# Patient Record
Sex: MALE | Race: WHITE | Employment: PART TIME | URBAN - METROPOLITAN AREA
[De-identification: names, ages, dates, MRNs, and addresses within clinical notes are randomized per-mention and may not be internally consistent; named-entity substitution may affect disease eponyms.]

---

## 2021-05-07 ENCOUNTER — HOSPITAL ENCOUNTER (EMERGENCY)
Age: 36
Discharge: HOME | End: 2021-05-07
Attending: EMERGENCY MEDICINE
Payer: COMMERCIAL

## 2021-05-07 VITALS
OXYGEN SATURATION: 98 % | HEIGHT: 77 IN | WEIGHT: 210 LBS | HEART RATE: 71 BPM | TEMPERATURE: 97.6 F | SYSTOLIC BLOOD PRESSURE: 139 MMHG | BODY MASS INDEX: 24.79 KG/M2 | DIASTOLIC BLOOD PRESSURE: 89 MMHG | RESPIRATION RATE: 16 BRPM
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
## 2021-05-07 DIAGNOSIS — L03.221 CELLULITIS OF NECK: Primary | ICD-10-CM

## 2021-05-07 PROCEDURE — 99282 EMERGENCY DEPT VISIT SF MDM: CPT

## 2021-05-07 RX ORDER — DOXYCYCLINE 100 MG/1
100 TABLET ORAL 2 TIMES DAILY
Qty: 20 TABLET | Refills: 0 | Status: SHIPPED | OUTPATIENT
Start: 2021-05-07 | End: 2021-05-17

## 2021-05-07 ASSESSMENT — ENCOUNTER SYMPTOMS
SHORTNESS OF BREATH: 0
VOMITING: 0
COUGH: 0
NAUSEA: 0

## 2021-05-07 ASSESSMENT — PAIN DESCRIPTION - DESCRIPTORS: DESCRIPTORS: SORE

## 2021-05-07 ASSESSMENT — PAIN SCALES - GENERAL: PAINLEVEL_OUTOF10: 3

## 2021-05-07 NOTE — ED PROVIDER NOTES
[address]  EMERGENCY DEPARTMENT ENCOUNTER      Pt Name: Viktor Askew  MRN: [id number]  [place] 1985  Date of evaluation: 5/7/21      CHIEF COMPLAINT       Chief Complaint   Patient presents with    Mass     on scalp     HISTORY OF PRESENT ILLNESS   HPI 31 y.o. male presents with c/o tick bite. Patient reports that 3 days ago he was bitten by a tick on the back of his neck. Over the last few days he has had some swelling and pain at the site. No fevers, no joint pains. He reports pain at the site is mild in severity rating at 3 out of 10. Constant in duration. No previous evaluations or treatments prior to arrival.. REVIEW OF SYSTEMS       Review of Systems   Constitutional: Negative for fever. HENT: Negative for congestion. Eyes: Negative for visual disturbance. Respiratory: Negative for cough and shortness of breath. Cardiovascular: Negative for chest pain. Gastrointestinal: Negative for nausea and vomiting. Musculoskeletal: Negative for arthralgias. Skin: Positive for rash. Neurological: Negative for dizziness and light-headedness. Psychiatric/Behavioral: Negative for confusion. PAST MEDICAL HISTORY     Past Medical History:   Diagnosis Date    Depression        SURGICAL HISTORY     History reviewed. No pertinent surgical history. CURRENT MEDICATIONS       Discharge Medication List as of 5/7/2021 12:53 PM      CONTINUE these medications which have NOT CHANGED    Details   PARoxetine (PAXIL) 30 MG tablet Take 30 mg by mouth every morning      cephALEXin (KEFLEX) 500 MG capsule Take 1 capsule by mouth 3 times daily, Disp-21 capsule, R-0      hydrocortisone (ANUSOL-HC) 25 MG suppository Place 1 suppository rectally 3 times daily, Disp-30 suppository, R-0             ALLERGIES     has No Known Allergies. FAMILY HISTORY     has no family status information on file. SOCIAL HISTORY      reports that he has been smoking cigarettes.  He uses smokeless tobacco.    PHYSICAL EXAM     INITIAL VITALS: /89   Pulse 71   Temp 97.6 °F (36.4 °C) (Temporal)   Resp 16   Ht 6' 5\" (1.956 m)   Wt 210 lb (95.3 kg)   SpO2 98%   BMI 24.90 kg/m²   Gen: NAD  Head: Normocephalic, atraumatic  Eye: Pupils equal round reactive to light, no conjunctivitis  ENT: MMM  Neck: There is a 1 cm x 1.5 cm area of erythema and induration on the left side of the lower portion of the neck. I do not appreciate any adenopathy. Heart: Regular rate and rhythm no murmurs  Lungs: Clear to auscultation bilaterally, no respiratory distress  Chest wall: No crepitus, no tenderness palpation  Abdomen: Soft, nontender, nondistended, with no peritoneal signs  Neurologic: Patient is alert and oriented x3, motor and sensation is intact in all 4 extremities, fluent speech  Extremities: No edema, no rash. MEDICAL DECISION MAKING:     MDM  31 y.o. male presenting with a tick bite and some cellulitis. My notes to target lesion. I think that likely has a cellulitis. I do not see signs of an abscess at this time. We will put on a course of doxycycline to treat for cellulitis. No sign of lyme disease. D/w pt the results, treatment plan, warning precautions for prompt ED return and importance of close OP FU, he verbalizes understanding and agrees with the treatment plan.        DIAGNOSTIC RESULTS     EMERGENCY DEPARTMENT COURSE:   Vitals:    Vitals:    05/07/21 1233   BP: 139/89   Pulse: 71   Resp: 16   Temp: 97.6 °F (36.4 °C)   TempSrc: Temporal   SpO2: 98%   Weight: 210 lb (95.3 kg)   Height: 6' 5\" (1.956 m)       The patient was given the following medications while in the emergency department:  Orders Placed This Encounter   Medications    doxycycline monohydrate (ADOXA) 100 MG tablet     Sig: Take 1 tablet by mouth 2 times daily for 10 days     Dispense:  20 tablet     Refill:  0     -------------------------  CRITICAL CARE:   CONSULTS: None  PROCEDURES: Procedures     FINAL IMPRESSION 1. Cellulitis of neck    2.  Tick bite, initial encounter          DISPOSITION/PLAN   DISPOSITION Decision To Discharge 05/07/2021 12:31:05 PM      PATIENT REFERRED TO:  Your Primary Care Provider    In 3 days      [facility] ED  [address]  [place] [address]  [phone]    If symptoms worsen      DISCHARGE MEDICATIONS:  Discharge Medication List as of 5/7/2021 12:53 PM      START taking these medications    Details   doxycycline monohydrate (ADOXA) 100 MG tablet Take 1 tablet by mouth 2 times daily for 10 days, Disp-20 tablet, R-0Normal               Venancio Green MD  Attending Emergency Physician                      Venancio Green MD  05/08/21 7702

## 2021-05-17 ENCOUNTER — HOSPITAL ENCOUNTER (EMERGENCY)
Age: 36
Discharge: HOME | End: 2021-05-17
Attending: EMERGENCY MEDICINE
Payer: COMMERCIAL

## 2021-05-17 ENCOUNTER — APPOINTMENT (OUTPATIENT)
Dept: GENERAL RADIOLOGY | Age: 36
End: 2021-05-17
Payer: COMMERCIAL

## 2021-05-17 VITALS
HEART RATE: 68 BPM | HEIGHT: 77 IN | DIASTOLIC BLOOD PRESSURE: 71 MMHG | OXYGEN SATURATION: 99 % | SYSTOLIC BLOOD PRESSURE: 148 MMHG | TEMPERATURE: 98 F | RESPIRATION RATE: 18 BRPM | WEIGHT: 217 LBS | BODY MASS INDEX: 25.62 KG/M2

## 2021-05-17 DIAGNOSIS — J02.9 ACUTE PHARYNGITIS, UNSPECIFIED ETIOLOGY: Primary | ICD-10-CM

## 2021-05-17 DIAGNOSIS — R09.82 POST-NASAL DRIP: ICD-10-CM

## 2021-05-17 LAB
DIRECT EXAM: NORMAL
Lab: NORMAL
SPECIMEN DESCRIPTION: NORMAL

## 2021-05-17 PROCEDURE — 99283 EMERGENCY DEPT VISIT LOW MDM: CPT

## 2021-05-17 PROCEDURE — 71045 X-RAY EXAM CHEST 1 VIEW: CPT

## 2021-05-17 PROCEDURE — 87880 STREP A ASSAY W/OPTIC: CPT

## 2021-05-17 RX ORDER — IBUPROFEN 600 MG/1
600 TABLET ORAL EVERY 6 HOURS PRN
Qty: 20 TABLET | Refills: 0 | Status: SHIPPED | OUTPATIENT
Start: 2021-05-17

## 2021-05-17 RX ORDER — FLUTICASONE PROPIONATE 50 MCG
1 SPRAY, SUSPENSION (ML) NASAL DAILY
Qty: 1 BOTTLE | Refills: 0 | Status: SHIPPED | OUTPATIENT
Start: 2021-05-17 | End: 2022-09-22

## 2021-05-17 NOTE — ED PROVIDER NOTES
eMERGENCY dEPARTMENT eNCOUnter   Independent Attestation     Pt Name: Treva Hernandez  MRN: [id number]  [place] 1985  Date of evaluation: 5/17/21     Treva Hernandez is a 31 y.o. male with CC: Pharyngitis      Based on the medical record the care appears appropriate. I was personally available for consultation in the Emergency Department.     Jose Guadalupe Benitez MD  Attending Emergency Physician                   Jose Guadalupe Benitez MD  05/17/21 8873
Disp-21 capsule, R-0      hydrocortisone (ANUSOL-HC) 25 MG suppository Place 1 suppository rectally 3 times daily, Disp-30 suppository, R-0             ALLERGIES     Patient has no known allergies. FAMILY HISTORY     No family history on file. No family status information on file. None otherwise stated in nurses notes    SOCIAL HISTORY      reports that he has been smoking cigarettes. He uses smokeless tobacco.   lives at home with others     PHYSICAL EXAM    (up to 7 for level 4, 8 or more for level 5)     ED Triage Vitals [05/17/21 1205]   BP Temp Temp Source Pulse Resp SpO2 Height Weight   (!) 148/71 98 °F (36.7 °C) Temporal 113 18 94 % 6' 5\" (1.956 m) 217 lb (98.4 kg)       Physical Exam   Nursing note and vitals reviewed. Constitutional: Oriented to person, place, and time and well-developed, well-nourished. Head: Normocephalic and atraumatic. Ear: External ears normal. TM non-erythematous with no canal erythema. Nose: Nose normal and midline. Eyes: Conjunctivae and EOM are normal. Pupils are equal, round, and reactive to light. Neck: Normal range of motion. Neck supple. Throat: Posterior pharynx is erythematous with no tonsillar swelling or exudates. Uvula midline. Airway patent. Cardiovascular: Normal rate, regular rhythm, normal heart sounds and intact distal pulses. Pulmonary/Chest: Effort normal and breath sounds normal. No respiratory distress. No wheezes. No rales. No chest tenderness. no rales, rhonchi, wheezing. Abdominal: Soft. Bowel sounds are normal. No distension and no mass. There is no tenderness. There is no rebound and no guarding. Musculoskeletal: Normal range of motion. Neurological: Alert and oriented to person, place, and time. GCS score is 15. Skin: Skin is warm and dry. No rash noted. No erythema. No pallor.    Psychiatric: Mood, memory, affect and judgment normal.           DIAGNOSTIC RESULTS     EKG: All EKG's are interpreted by the Emergency Department

## 2022-09-22 ENCOUNTER — OFFICE VISIT (OUTPATIENT)
Dept: PRIMARY CARE CLINIC | Age: 37
End: 2022-09-22
Payer: COMMERCIAL

## 2022-09-22 VITALS
DIASTOLIC BLOOD PRESSURE: 82 MMHG | HEART RATE: 56 BPM | SYSTOLIC BLOOD PRESSURE: 134 MMHG | BODY MASS INDEX: 24.21 KG/M2 | WEIGHT: 205 LBS | OXYGEN SATURATION: 97 % | HEIGHT: 77 IN | TEMPERATURE: 97.2 F

## 2022-09-22 DIAGNOSIS — L08.9 SKIN INFECTION: ICD-10-CM

## 2022-09-22 DIAGNOSIS — L08.9 STAPH SKIN INFECTION: Primary | ICD-10-CM

## 2022-09-22 DIAGNOSIS — R09.81 CONGESTION OF NASAL SINUS: ICD-10-CM

## 2022-09-22 DIAGNOSIS — R09.82 POST-NASAL DRIP: ICD-10-CM

## 2022-09-22 DIAGNOSIS — B95.8 STAPH SKIN INFECTION: Primary | ICD-10-CM

## 2022-09-22 PROCEDURE — 4004F PT TOBACCO SCREEN RCVD TLK: CPT | Performed by: NURSE PRACTITIONER

## 2022-09-22 PROCEDURE — 99213 OFFICE O/P EST LOW 20 MIN: CPT | Performed by: NURSE PRACTITIONER

## 2022-09-22 PROCEDURE — G8427 DOCREV CUR MEDS BY ELIG CLIN: HCPCS | Performed by: NURSE PRACTITIONER

## 2022-09-22 PROCEDURE — G8420 CALC BMI NORM PARAMETERS: HCPCS | Performed by: NURSE PRACTITIONER

## 2022-09-22 PROCEDURE — 99212 OFFICE O/P EST SF 10 MIN: CPT

## 2022-09-22 RX ORDER — LORATADINE 10 MG/1
10 TABLET ORAL DAILY
Qty: 30 TABLET | Refills: 0 | Status: SHIPPED | OUTPATIENT
Start: 2022-09-22 | End: 2022-10-22

## 2022-09-22 RX ORDER — FLUTICASONE PROPIONATE 50 MCG
1 SPRAY, SUSPENSION (ML) NASAL 2 TIMES DAILY
Qty: 16 G | Refills: 0 | Status: SHIPPED | OUTPATIENT
Start: 2022-09-22

## 2022-09-22 RX ORDER — MUPIROCIN CALCIUM 20 MG/G
CREAM TOPICAL
Qty: 1 EACH | Refills: 0 | Status: SHIPPED | OUTPATIENT
Start: 2022-09-22 | End: 2022-10-22

## 2022-09-22 ASSESSMENT — ENCOUNTER SYMPTOMS
VOMITING: 0
RESPIRATORY NEGATIVE: 1
GASTROINTESTINAL NEGATIVE: 1
COUGH: 0
DIARRHEA: 0
RHINORRHEA: 0

## 2022-09-22 NOTE — PROGRESS NOTES
[facility] A department of [facility]  [facility] [address]  Phone: [phone]  Fax: [phone]      Kilo Villagomez is a 30 y.o. male who presents to the [facility] today for his medical conditions/complaints as noted below. Kilo Villagomez is c/o of Otalgia (Pain/Blisters/bumps on outside of right ear )          HPI:     Blisters along the outside of the ear from wearing a blue tooth on the ear headset. Redness and swelling. Patient states that he popped the blisters earlier today. No fever. Otalgia   There is pain in the right ear. This is a new problem. The current episode started yesterday. The problem occurs constantly. The problem has been gradually worsening. There has been no fever. The pain is moderate. Associated symptoms include a rash (redness and blisters around outer edge of right ear. ). Pertinent negatives include no coughing, diarrhea, rhinorrhea or vomiting. He has tried nothing for the symptoms. The treatment provided no relief. There is no history of a chronic ear infection, hearing loss or a tympanostomy tube. Past Medical History:   Diagnosis Date    Depression         No Known Allergies    Wt Readings from Last 3 Encounters:   09/22/22 205 lb (93 kg)   05/17/21 217 lb (98.4 kg)   05/07/21 210 lb (95.3 kg)     BP Readings from Last 3 Encounters:   09/22/22 134/82   05/17/21 (!) 148/71   05/07/21 139/89      Temp Readings from Last 3 Encounters:   09/22/22 97.2 °F (36.2 °C)   05/17/21 98 °F (36.7 °C) (Temporal)   05/07/21 97.6 °F (36.4 °C) (Temporal)     Pulse Readings from Last 3 Encounters:   09/22/22 56   05/17/21 68   05/07/21 71     SpO2 Readings from Last 3 Encounters:   09/22/22 97%   05/17/21 99%   05/07/21 98%       Subjective:      Review of Systems   Constitutional: Negative. Negative for appetite change, chills and fever. HENT:  Positive for ear pain (right side along rim of ear.) and postnasal drip.  Negative for rhinorrhea. Respiratory: Negative. Negative for cough. Cardiovascular: Negative. Gastrointestinal: Negative. Negative for diarrhea and vomiting. Genitourinary: Negative. Musculoskeletal: Negative. Skin:  Positive for rash (redness and blisters around outer edge of right ear. ). Neurological: Negative. Hematological: Negative. Psychiatric/Behavioral: Negative. All other systems reviewed and are negative. Objective:     Vitals:    09/22/22 1857   BP: 134/82   Site: Left Upper Arm   Position: Sitting   Pulse: 56   Temp: 97.2 °F (36.2 °C)   SpO2: 97%   Weight: 205 lb (93 kg)   Height: 6' 5\" (1.956 m)     Body mass index is 24.31 kg/m². /82 (Site: Left Upper Arm, Position: Sitting)   Pulse 56   Temp 97.2 °F (36.2 °C)   Ht 6' 5\" (1.956 m)   Wt 205 lb (93 kg)   SpO2 97%   BMI 24.31 kg/m²   Physical Exam  Vitals and nursing note reviewed. Constitutional:       General: He is not in acute distress. Appearance: He is well-developed. He is not ill-appearing. HENT:      Right Ear: Tympanic membrane and ear canal normal. Swelling present. Left Ear: Tympanic membrane, ear canal and external ear normal.      Ears:        Comments: Small blisters around the outer rim of the right ear with redness and slight swelling. Nose: Congestion present. Right Turbinates: Swollen. Left Turbinates: Swollen. Mouth/Throat:      Lips: Pink. Mouth: Mucous membranes are moist.      Pharynx: Oropharynx is clear. Eyes:      Conjunctiva/sclera: Conjunctivae normal.      Pupils: Pupils are equal, round, and reactive to light. Neck:      Thyroid: No thyromegaly. Cardiovascular:      Rate and Rhythm: Normal rate and regular rhythm. Pulses: Normal pulses. Heart sounds: Normal heart sounds. No murmur heard. Pulmonary:      Effort: Pulmonary effort is normal. No respiratory distress. Breath sounds: Normal breath sounds. No wheezing or rales. Abdominal:      Palpations: Abdomen is soft. Musculoskeletal:         General: Normal range of motion. Cervical back: Normal range of motion and neck supple. Lymphadenopathy:      Head:      Right side of head: No preauricular or posterior auricular adenopathy. Left side of head: No preauricular or posterior auricular adenopathy. Cervical: No cervical adenopathy. Skin:     General: Skin is warm and dry. Capillary Refill: Capillary refill takes less than 2 seconds. Findings: No rash. Neurological:      General: No focal deficit present. Mental Status: He is alert and oriented to person, place, and time. Mental status is at baseline. Psychiatric:         Mood and Affect: Mood normal.         Behavior: Behavior normal.         Judgment: Judgment normal.       Assessment and Plan      Diagnosis Orders   1. Staph skin infection  mupirocin (BACTROBAN) 2 % cream      2. Congestion of nasal sinus  loratadine (CLARITIN) 10 MG tablet    fluticasone (FLONASE) 50 MCG/ACT nasal spray      3. Post-nasal drip  loratadine (CLARITIN) 10 MG tablet    fluticasone (FLONASE) 50 MCG/ACT nasal spray      4. Skin infection  mupirocin (BACTROBAN) 2 % cream        Orders Placed This Encounter    mupirocin (BACTROBAN) 2 % cream     Sig: Apply 3 times daily. Dispense:  1 each     Refill:  0    loratadine (CLARITIN) 10 MG tablet     Sig: Take 1 tablet by mouth daily     Dispense:  30 tablet     Refill:  0    fluticasone (FLONASE) 50 MCG/ACT nasal spray     Si spray by Each Nostril route in the morning and 1 spray in the evening. Dispense:  16 g     Refill:  0     Discuses cleansing head set on a daily basis and trying to limit wearing on the right side if possible. Advised to quit smoking      Discussed exam, POCT findings, plan of care, and follow-up at length with patient. Reviewed all prescribed and recommended medications, administration and side effects.  Encouraged patient to follow up with PCP or return to the clinic for no improvement and or worsening of symptoms. All questions were answered and they verbalized understanding and were agreeable with the plan. Follow up as needed.         Electronically signed by JAXON Hernandez CNP on 9/22/2022 at 7:54 PM

## 2023-01-27 ENCOUNTER — HOSPITAL ENCOUNTER (EMERGENCY)
Age: 38
Discharge: HOME | End: 2023-01-27
Attending: EMERGENCY MEDICINE
Payer: COMMERCIAL

## 2023-01-27 ENCOUNTER — APPOINTMENT (OUTPATIENT)
Dept: GENERAL RADIOLOGY | Age: 38
End: 2023-01-27
Payer: COMMERCIAL

## 2023-01-27 VITALS
SYSTOLIC BLOOD PRESSURE: 137 MMHG | DIASTOLIC BLOOD PRESSURE: 82 MMHG | RESPIRATION RATE: 18 BRPM | BODY MASS INDEX: 24.36 KG/M2 | HEIGHT: 76 IN | WEIGHT: 200 LBS | TEMPERATURE: 98.2 F | OXYGEN SATURATION: 97 % | HEART RATE: 86 BPM

## 2023-01-27 DIAGNOSIS — S39.012A STRAIN OF LUMBAR REGION, INITIAL ENCOUNTER: Primary | ICD-10-CM

## 2023-01-27 PROCEDURE — 72100 X-RAY EXAM L-S SPINE 2/3 VWS: CPT

## 2023-01-27 PROCEDURE — 99283 EMERGENCY DEPT VISIT LOW MDM: CPT

## 2023-01-27 ASSESSMENT — LIFESTYLE VARIABLES: HOW OFTEN DO YOU HAVE A DRINK CONTAINING ALCOHOL: NEVER

## 2023-01-27 ASSESSMENT — PAIN DESCRIPTION - PAIN TYPE: TYPE: ACUTE PAIN

## 2023-01-27 ASSESSMENT — PAIN DESCRIPTION - DESCRIPTORS: DESCRIPTORS: SHARP

## 2023-01-27 ASSESSMENT — PAIN - FUNCTIONAL ASSESSMENT: PAIN_FUNCTIONAL_ASSESSMENT: 0-10

## 2023-01-27 ASSESSMENT — PAIN DESCRIPTION - ORIENTATION: ORIENTATION: MID

## 2023-01-27 ASSESSMENT — PAIN SCALES - GENERAL: PAINLEVEL_OUTOF10: 7

## 2023-01-27 ASSESSMENT — PAIN DESCRIPTION - LOCATION: LOCATION: BACK

## 2023-01-27 NOTE — DISCHARGE INSTRUCTIONS
Motrin and/or Tylenol as needed for pain  Heat as needed  Follow-up with family physician call same day phone number to arrange an appointment may need an MRI or physical therapy if not improving  Return immediately if any worsening symptoms including worsening pain, loss of bowel or bladder control, weakness or any other concerns    Tell us how we did visit: [email or web address]   and let us know about your experience

## 2023-01-27 NOTE — ED PROVIDER NOTES
[facility] ED  [address]  [place] [address]  Phone: [phone]  [place]      Pt Name: Yaw Rodriguez  MRN: [id number]  [place] 1985  Date of evaluation: 1/27/2023    CHIEF COMPLAINT       Chief Complaint   Patient presents with    Back Pain     Was lifting transmission       HISTORY OF PRESENT ILLNESS    Yaw Rodriguez is a 30 y.o. male who presents to the emergency department low back pain after suffering an injury yesterday lifting a transmission. Denies any paresthesias or weakness or loss of bowel or bladder control. Does have a history of back pain he is wearing a brace. He took some ibuprofen yesterday. He does not want anything for pain at this time he is on Suboxone currently. He rates his pain 7 out of 10 sharp worse with movement nonradiating. Focal pain L3-L4. No other injuries. REVIEW OF SYSTEMS       Constitutional: No fevers or chills   HEENT: No sore throat, rhinorrhea, or earache   Eyes: No blurry vision or double vision no drainage   Cardiovascular: No chest pain or tachycardia   Respiratory: No wheezing or shortness of breath no cough   Gastrointestinal: No nausea, vomiting, diarrhea, constipation, or abdominal pain   : No hematuria or dysuria   Musculoskeletal: No extremity swelling or pain positive low back pain  Skin: No rash   Neurological: No focal neurologic complaints, paresthesias, weakness, or headache     PAST MEDICAL HISTORY    has a past medical history of Depression. SURGICAL HISTORY      has no past surgical history on file.     CURRENT MEDICATIONS       Discharge Medication List as of 1/27/2023 12:01 PM        CONTINUE these medications which have NOT CHANGED    Details   fluticasone (FLONASE) 50 MCG/ACT nasal spray 1 spray by Each Nostril route in the morning and 1 spray in the evening., Disp-16 g, R-0Normal      Buprenorphine HCl-Naloxone HCl (SUBOXONE SL) Place under the tongueHistorical Med      ibuprofen (IBU) 600 MG tablet Take 1 tablet by mouth every 6 hours as needed for Pain, Disp-20 tablet, R-0Print      PARoxetine (PAXIL) 30 MG tablet Take 30 mg by mouth every morning      cephALEXin (KEFLEX) 500 MG capsule Take 1 capsule by mouth 3 times daily, Disp-21 capsule, R-0      hydrocortisone (ANUSOL-HC) 25 MG suppository Place 1 suppository rectally 3 times daily, Disp-30 suppository, R-0             ALLERGIES     has No Known Allergies. FAMILY HISTORY     has no family status information on file. family history is not on file. SOCIAL HISTORY      reports that he has been smoking cigarettes. He uses smokeless tobacco.    PHYSICAL EXAM       ED Triage Vitals [01/27/23 1030]   BP Temp Temp src Heart Rate Resp SpO2 Height Weight   137/82 98.2 °F (36.8 °C) -- 86 18 97 % 6' 4\" (1.93 m) 200 lb (90.7 kg)     Constitutional: Alert, oriented x3, nontoxic, answering questions appropriately, acting properly for age, in no acute distress   HEENT: Extraocular muscles intact,   Neck: Trachea midline   Cardiovascular: Regular rhythm and rate no murmurs   Respiratory: Clear to auscultation bilaterally no wheezes, rhonchi, rales, no respiratory distress no tachypnea no retractions no hypoxia  Gastrointestinal: Soft, nontender, nondistended, positive bowel sounds. No rebound, rigidity, or guarding. Musculoskeletal: No extremity pain or swelling tenderness palpation L3-L4 no ecchymosis or deformity no rash. Neurologic: Moving all 4 extremities without difficulty there are no gross focal neurologic deficits no saddle anesthesia. Skin: Warm and dry     DIFFERENTIAL DIAGNOSIS/ MDM:     No signs of cauda equina syndrome. Midline back pain after lifting a transmission we will obtain x-rays of the lumbar spine. Does not want anything for pain.     DIAGNOSTIC RESULTS     EKG: All EKG's are interpreted by the Emergency Department Physician who either signs or Co-signs this chart in the absence of a cardiologist.        Not indicated unless otherwise documented above    LABS:  No results found for this visit on 01/27/23. Not indicated unless otherwise documented above    RADIOLOGY:   I reviewed the radiologist interpretations:    XR LUMBAR SPINE (2-3 VIEWS)   Final Result   1. Mild multilevel degenerative changes in the lumbar spine. 2. No acute vertebral body height loss or malalignment within the lumbar   spine. Not indicated unless otherwise documented above    EMERGENCY DEPARTMENT COURSE:     The patient was given the following medications:  No orders of the defined types were placed in this encounter. Vitals:   -------------------------  /82   Pulse 86   Temp 98.2 °F (36.8 °C)   Resp 18   Ht 6' 4\" (1.93 m)   Wt 200 lb (90.7 kg)   SpO2 97%   BMI 24.34 kg/m²     11:40 AM resting no acute distress. X-ray unremarkable for fracture. Recommend follow-up with family physician for reevaluation for possible MRI or physical therapy. Limit bending twisting and lifting. Return if worsening symptoms or any other concerns. No signs of cauda equina syndrome. Motrin and or Tylenol as needed heat as needed    The patient understands that at this time there is no evidence for a more malignant underlying process, but also understands that early in the process of an illness or injury, an emergency department workup can be falsely reassuring. Routine discharge counseling was given, and it is understood that worsening, changing or persistent symptoms should prompt an immediate call or follow up with their primary physician or return to the emergency department. The importance of appropriate follow up was also discussed. I have reviewed the disposition diagnosis. I have answered the questions and given discharge instructions. There was voiced understanding of these instructions and no further questions or complaints.     CRITICAL CARE:    None    PROCEDURES:    None      OARRS Report if indicated             FINAL IMPRESSION      1.  Strain of lumbar region, initial encounter          DISPOSITION/PLAN   DISPOSITION Decision To Discharge 01/27/2023 11:39:23 AM        CONDITION ON DISPOSITION: STABLE       PATIENT REFERRED TO:  Family Physician - [facility]  [phone]  Schedule an appointment as soon as possible for a visit in 3 days      DISCHARGE MEDICATIONS:  Discharge Medication List as of 1/27/2023 12:01 PM          (Please note that portions of this note were completed with a voice recognition program.  Efforts were made to edit the dictations but occasionally words are mis-transcribed.)    Elif Sánchez DO   Attending Emergency Physician     Elif Sánchez DO  01/27/23 6309

## 2023-10-17 ENCOUNTER — OFFICE VISIT (OUTPATIENT)
Dept: PRIMARY CARE CLINIC | Age: 38
End: 2023-10-17
Payer: COMMERCIAL

## 2023-10-17 VITALS
TEMPERATURE: 97.8 F | WEIGHT: 221 LBS | DIASTOLIC BLOOD PRESSURE: 74 MMHG | HEART RATE: 80 BPM | SYSTOLIC BLOOD PRESSURE: 118 MMHG | BODY MASS INDEX: 26.9 KG/M2 | OXYGEN SATURATION: 98 %

## 2023-10-17 DIAGNOSIS — K04.7 DENTAL ABSCESS: Primary | ICD-10-CM

## 2023-10-17 PROCEDURE — 99212 OFFICE O/P EST SF 10 MIN: CPT | Performed by: FAMILY MEDICINE

## 2023-10-17 PROCEDURE — G8484 FLU IMMUNIZE NO ADMIN: HCPCS | Performed by: FAMILY MEDICINE

## 2023-10-17 PROCEDURE — G8419 CALC BMI OUT NRM PARAM NOF/U: HCPCS | Performed by: FAMILY MEDICINE

## 2023-10-17 PROCEDURE — 4004F PT TOBACCO SCREEN RCVD TLK: CPT | Performed by: FAMILY MEDICINE

## 2023-10-17 PROCEDURE — G8427 DOCREV CUR MEDS BY ELIG CLIN: HCPCS | Performed by: FAMILY MEDICINE

## 2023-10-17 PROCEDURE — 99214 OFFICE O/P EST MOD 30 MIN: CPT | Performed by: FAMILY MEDICINE

## 2023-10-17 RX ORDER — AMOXICILLIN 875 MG/1
875 TABLET, COATED ORAL 2 TIMES DAILY
Qty: 28 TABLET | Refills: 0 | Status: SHIPPED | OUTPATIENT
Start: 2023-10-17

## 2023-10-17 ASSESSMENT — PATIENT HEALTH QUESTIONNAIRE - PHQ9
2. FEELING DOWN, DEPRESSED OR HOPELESS: 0
SUM OF ALL RESPONSES TO PHQ QUESTIONS 1-9: 0
1. LITTLE INTEREST OR PLEASURE IN DOING THINGS: 0
SUM OF ALL RESPONSES TO PHQ9 QUESTIONS 1 & 2: 0
SUM OF ALL RESPONSES TO PHQ QUESTIONS 1-9: 0

## 2023-10-17 ASSESSMENT — ENCOUNTER SYMPTOMS
RHINORRHEA: 0
SINUS PRESSURE: 0
SINUS PAIN: 0
SORE THROAT: 0

## 2023-10-26 ENCOUNTER — OFFICE VISIT (OUTPATIENT)
Dept: PRIMARY CARE CLINIC | Age: 38
End: 2023-10-26
Payer: COMMERCIAL

## 2023-10-26 VITALS
SYSTOLIC BLOOD PRESSURE: 110 MMHG | WEIGHT: 220 LBS | OXYGEN SATURATION: 98 % | HEART RATE: 74 BPM | DIASTOLIC BLOOD PRESSURE: 74 MMHG | BODY MASS INDEX: 26.78 KG/M2 | TEMPERATURE: 97.4 F

## 2023-10-26 DIAGNOSIS — K04.7 DENTAL ABSCESS: Primary | ICD-10-CM

## 2023-10-26 PROCEDURE — 99213 OFFICE O/P EST LOW 20 MIN: CPT | Performed by: FAMILY MEDICINE

## 2023-10-26 PROCEDURE — G8427 DOCREV CUR MEDS BY ELIG CLIN: HCPCS | Performed by: FAMILY MEDICINE

## 2023-10-26 PROCEDURE — 4004F PT TOBACCO SCREEN RCVD TLK: CPT | Performed by: FAMILY MEDICINE

## 2023-10-26 PROCEDURE — G8419 CALC BMI OUT NRM PARAM NOF/U: HCPCS | Performed by: FAMILY MEDICINE

## 2023-10-26 PROCEDURE — G8484 FLU IMMUNIZE NO ADMIN: HCPCS | Performed by: FAMILY MEDICINE

## 2023-10-26 PROCEDURE — 99212 OFFICE O/P EST SF 10 MIN: CPT | Performed by: FAMILY MEDICINE

## 2023-10-26 RX ORDER — AMOXICILLIN AND CLAVULANATE POTASSIUM 875; 125 MG/1; MG/1
1 TABLET, FILM COATED ORAL 2 TIMES DAILY
Qty: 20 TABLET | Refills: 0 | Status: SHIPPED | OUTPATIENT
Start: 2023-10-26 | End: 2023-11-05

## 2023-10-26 ASSESSMENT — PATIENT HEALTH QUESTIONNAIRE - PHQ9
SUM OF ALL RESPONSES TO PHQ9 QUESTIONS 1 & 2: 0
SUM OF ALL RESPONSES TO PHQ QUESTIONS 1-9: 0
1. LITTLE INTEREST OR PLEASURE IN DOING THINGS: 0
2. FEELING DOWN, DEPRESSED OR HOPELESS: 0